# Patient Record
Sex: FEMALE | Race: WHITE | NOT HISPANIC OR LATINO | Employment: FULL TIME | URBAN - METROPOLITAN AREA
[De-identification: names, ages, dates, MRNs, and addresses within clinical notes are randomized per-mention and may not be internally consistent; named-entity substitution may affect disease eponyms.]

---

## 2017-01-12 ENCOUNTER — ALLSCRIPTS OFFICE VISIT (OUTPATIENT)
Dept: OTHER | Facility: OTHER | Age: 72
End: 2017-01-12

## 2017-01-24 ENCOUNTER — ALLSCRIPTS OFFICE VISIT (OUTPATIENT)
Dept: OTHER | Facility: OTHER | Age: 72
End: 2017-01-24

## 2017-02-14 ENCOUNTER — GENERIC CONVERSION - ENCOUNTER (OUTPATIENT)
Dept: OTHER | Facility: OTHER | Age: 72
End: 2017-02-14

## 2017-06-01 DIAGNOSIS — E78.5 HYPERLIPIDEMIA: ICD-10-CM

## 2017-06-01 DIAGNOSIS — R73.9 HYPERGLYCEMIA: ICD-10-CM

## 2017-06-05 ENCOUNTER — HOSPITAL ENCOUNTER (OUTPATIENT)
Dept: RADIOLOGY | Facility: HOSPITAL | Age: 72
Discharge: HOME | End: 2017-06-05
Attending: SURGERY
Payer: MEDICARE

## 2017-06-05 DIAGNOSIS — D49.0 NEOPLASM OF DIGESTIVE SYSTEM: ICD-10-CM

## 2017-06-05 PROCEDURE — A9585 GADOBUTROL INJECTION: HCPCS | Performed by: SURGERY

## 2017-06-05 PROCEDURE — 74181 MRI ABDOMEN W/O CONTRAST: CPT

## 2017-06-05 RX ADMIN — GADOBUTROL 5.9 ML: 604.72 INJECTION INTRAVENOUS at 10:43

## 2017-06-06 ENCOUNTER — HOSPITAL ENCOUNTER (OUTPATIENT)
Dept: MAMMOGRAPHY | Facility: MEDICAL CENTER | Age: 72
Discharge: HOME | End: 2017-06-06
Payer: MEDICARE

## 2017-06-06 DIAGNOSIS — D05.00 LOBULAR CARCINOMA IN SITU OF BREAST: ICD-10-CM

## 2017-06-06 DIAGNOSIS — Z12.31 ENCOUNTER FOR SCREENING MAMMOGRAM FOR HIGH-RISK PATIENT: ICD-10-CM

## 2017-06-06 PROCEDURE — 77063 BREAST TOMOSYNTHESIS BI: CPT

## 2017-06-06 PROCEDURE — G0202 SCR MAMMO BI INCL CAD: HCPCS

## 2017-06-14 ENCOUNTER — ALLSCRIPTS OFFICE VISIT (OUTPATIENT)
Dept: OTHER | Facility: OTHER | Age: 72
End: 2017-06-14

## 2017-07-03 ENCOUNTER — GENERIC CONVERSION - ENCOUNTER (OUTPATIENT)
Dept: OTHER | Facility: OTHER | Age: 72
End: 2017-07-03

## 2017-07-13 ENCOUNTER — ALLSCRIPTS OFFICE VISIT (OUTPATIENT)
Dept: OTHER | Facility: OTHER | Age: 72
End: 2017-07-13

## 2017-09-29 ENCOUNTER — GENERIC CONVERSION - ENCOUNTER (OUTPATIENT)
Dept: OTHER | Facility: OTHER | Age: 72
End: 2017-09-29

## 2018-01-09 NOTE — PROGRESS NOTES
History of Present Illness  Care Coordination Encounter Information:   Type of Encounter: Telephonic   Contact: Initial Contact   Last Office Visit: 3/9/16   Spoke to Patient   Outreached patient to see how she is doing post hospitalization for shortness of breath, CHF and AFib on 16  she did have follow up with primary care on 3/9/2016  She states she is doing well  She has an appointment with cardiology on 16 Dr Martin Hartman for an ablation consult  Only time she gets short of breath if she climbs two flights of stairs otherwise no problems  Active Problems    1  Anticoagulant long-term use (V58 61) (Z79 01)   2  Atrial fibrillation (427 31) (I48 91)   3  Congestive heart failure (428 0) (I50 9)   4  Depression (311) (F32 9)   5  Gastroesophageal reflux disease (530 81) (K21 9)   6  Hyperlipidemia (272 4) (E78 5)   7  Hypertension (401 9) (I10)   8  IPMN (intraductal papillary mucinous neoplasm) (239 0) (D49 0)   9  Lobular carcinoma in situ of breast (233 0) (D05 00)   10  Pancreatic mass (577 9) (K86 9)   11  Prediabetes (790 29) (R73 09)   12  Pulmonary hypertension (416 8) (I27 2)   13  Vitamin D deficiency (268 9) (E55 9)    Past Medical History    1  History of Arthritis (V13 4)   2  History of Atypical ductal hyperplasia of breast (610 8) (N60 99)   3  History of Depression (311) (F32 9)   4  History of Encounter for screening colonoscopy (V76 51) (Z12 11)   5  History of Heart burn (787 1) (R12)   6  History of hypertension (V12 59) (Z86 79)   7  History of Reported Prior Stomach Problems (V12 79)    Surgical History    1  History of  Section   2  History of Oophorectomy For Ectopic Pregnancy   3  History of Tubal Ligation    Family History    1  Family history of malignant neoplasm of breast (V16 3) (Z80 3)   2  Family history of Metastatic Cancer    3  Family history of cardiac disorder (V17 49) (Z82 49)    4  Family history of Leukemia (V16 6)   5   Family history of Metastatic Cancer    Social History    · Being A Social Drinker   · Denied: History of Drug Use   · Former smoker (V15 82) (O32 968)    Current Meds    1  Pradaxa 150 MG Oral Capsule; Take 1 tab BID; Therapy: 91MIK8913 to Recorded    2  PriLOSEC OTC 20 MG Oral Tablet Delayed Release; TAKE 1 TABLET DAILY; Therapy: 51VSQ2115 to Recorded    3  Atorvastatin Calcium 20 MG Oral Tablet; Therapy: 50EMY8492 to Recorded   4  Simvastatin 40 MG Oral Tablet; TAKE 1 TABLET DAILY AS DIRECTED; Therapy: 56FHD6637 to Recorded    5  Lisinopril 40 MG Oral Tablet; TAKE 1 TABLET DAILY; Therapy: 33UKX7150 to Recorded   6  Metoprolol Tartrate 100 MG Oral Tablet; Therapy: 96MXZ4349 to Recorded    7  Tamoxifen Citrate 20 MG Oral Tablet; Take 1 tablet daily; Therapy: 94CRY7781 to (Last Rx:19Jan2016)  Requested for: 20Jan2016 Ordered    8  MetFORMIN HCl - 500 MG Oral Tablet; Take 2 tablets daily; Therapy: 85CDA4116 to (Last Rx:17Nov2015)  Requested for: 30IGJ9548 Ordered    9  Vitamin D (Ergocalciferol) 93489 UNIT Oral Capsule; TAKE 1 CAPULE ONCE MONTHLY; Therapy: 82OHM6028 to (Last Rx:16Nov2015)  Requested for: 54VMO2508 Ordered    Allergies    1  No Known Drug Allergies    End of Encounter Meds    1  Pradaxa 150 MG Oral Capsule; Take 1 tab BID; Therapy: 39RYQ8018 to Recorded    2  PriLOSEC OTC 20 MG Oral Tablet Delayed Release; TAKE 1 TABLET DAILY; Therapy: 77IPZ0399 to Recorded    3  Atorvastatin Calcium 20 MG Oral Tablet; Therapy: 55KBD9108 to Recorded   4  Simvastatin 40 MG Oral Tablet; TAKE 1 TABLET DAILY AS DIRECTED; Therapy: 65ZCK5723 to Recorded    5  Lisinopril 40 MG Oral Tablet; TAKE 1 TABLET DAILY; Therapy: 54SYY3521 to Recorded   6  Metoprolol Tartrate 100 MG Oral Tablet; Therapy: 88GJT0951 to Recorded    7  Tamoxifen Citrate 20 MG Oral Tablet; Take 1 tablet daily; Therapy: 53PPB1019 to (Last Rx:19Jan2016)  Requested for: 20Jan2016 Ordered    8  MetFORMIN HCl - 500 MG Oral Tablet;  Take 2 tablets daily; Therapy: 33MRZ4436 to (Last Rx:17Nov2015)  Requested for: 06AIH4564 Ordered    9  Vitamin D (Ergocalciferol) 26233 UNIT Oral Capsule; TAKE 1 CAPULE ONCE MONTHLY; Therapy: 23DEE7198 to (Last Rx:16Nov2015)  Requested for: 18JRQ4640 Ordered    Future Appointments    Date/Time Provider Specialty Site   06/14/2016 10:00 AM NARDA Doherty  Surgical Oncology [facility]   06/06/2016 09:15 AM Gretchen Etienne MD Hematology Oncology [facility] MEDICAL ONCOLOG[facility]   05/23/2016 09:00 AM NARDA Paris   Internal Medicine Petra Haynes MD   04/01/2016 10:20 AM Amy Marinelli DO Cardiology [facility]     Patient Care Team    Care Team Member Role Specialty Office Number   Lucy Dorsey MD  Cardiology [phone]   Dimitrios Headley MD  Surgical Oncology [phone]   Awais Barrett MD  Obstetrics/Gynecology [phone]   Nikhil Harley MD  Gastroenterology Adult [phone]     Signatures   Electronically signed by : Martha Espinoza RN; Mar 30 2016 10:41AM EST                       (Author)

## 2018-01-10 NOTE — MISCELLANEOUS
Message   Recorded as Task   Date: 05/26/2016 04:13 PM, Created By: Chayito Martínez   Task Name: Follow Up   [facility]   Regarding Patient: Krysta Go, Status: In Progress   Comment:    Cuca Padilla - 26 May 2016 4:13 PM     TASK CREATED  Hi  Please relay to Ciara SNIDER printed up nurse visit note and EKG for Dr Teofilo Hdez to review  He said EKG looked good, no changes  Thanks  Rosmery Cabrera - 27 May 2016 8:05 AM     TASK IN PROGRESS   Phone call to patient,follow up to EKG from yest  Per Dr Teofilo Hdez EKG looks good, no changes  Patient understands and agrees      Active Problems    1  Abnormal CAT scan (793 99) (R93 8)   2  Anticoagulant long-term use (V58 61) (Z79 01)   3  Atrial fibrillation (427 31) (I48 91)   4  Congestive heart failure (428 0) (I50 9)   5  Depression (311) (F32 9)   6  Gastroesophageal reflux disease (530 81) (K21 9)   7  Hyperlipidemia (272 4) (E78 5)   8  Hypertension (401 9) (I10)   9  IPMN (intraductal papillary mucinous neoplasm) (239 0) (D49 0)   10  Lobular carcinoma in situ of breast (233 0) (D05 00)   11  Pancreatic mass (577 9) (K86 9)   12  Prediabetes (790 29) (R73 09)   13  Pulmonary hypertension (416 8) (I27 2)   14  Vitamin D deficiency (268 9) (E55 9)    Current Meds   1  Flecainide Acetate 100 MG Oral Tablet; take 1 tablet every twelve hours; Therapy: 39HQU7538 to (Evaluate:36Gjq6434); Last IJ:75HOV3247 Ordered   2  Furosemide 40 MG Oral Tablet; Take 1 tablet daily; Therapy: (Recorded:41Eat1044) to Recorded   3  Klor-Con M20 20 MEQ Oral Tablet Extended Release; TAKE TABLET Daily; Therapy: (Recorded:45Kkw4535) to Recorded   4  LORazepam 2 MG Oral Tablet; TAKE 1 TABLET Bedtime; Therapy: (Recorded:08Enn2545) to Recorded   5  MetFORMIN HCl - 500 MG Oral Tablet; Take 2 tablets daily; Therapy: 91GAY7787 to (Last Rx:17Nov2015)  Requested for: 23TDW2306 Ordered   6   Metoprolol Tartrate 100 MG Oral Tablet; TAKE 1 TABLET EVERY 12 HOURS DAILY; Therapy: 10KPY7566 to (Evaluate:28Oct2016) Recorded   7  Omeprazole 20 MG Oral Tablet Delayed Release; Take 1 tablet daily; Therapy: (Recorded:26May2016) to Recorded   8  Pradaxa 150 MG Oral Capsule; Take 1 tab BID; Therapy: 85YKY8374 to Recorded   9  Sertraline HCl - 50 MG Oral Tablet; Therapy: (Recorded:26May2016) to Recorded   10  Tamoxifen Citrate 20 MG Oral Tablet; Take 1 tablet daily; Therapy: 78HUC6353 to (Last Rx:19Jan2016)  Requested for: 20Jan2016 Ordered    Allergies    1   No Known Drug Allergies    Signatures   Electronically signed by : Zunilda Olmos, ; May 27 2016 11:23AM EST                       (Administrative)

## 2018-01-10 NOTE — MISCELLANEOUS
Message   Recorded as Task   Date: 05/23/2016 01:45 PM, Created By: Omi Matthews   Task Name: Follow Up   Assigned To: Citlalli Kendall   Regarding Patient: Zacarias Metzger, Status: Complete   Comment:    Omi Matthews - 23 May 2016 1:45 PM     TASK CREATED  Caller: Self; General Medical Question; [phone] (Home); [phone] (Work)  pt had ablation on Thurs May 19  Iliana Chin is great 60 -70 bpm but when she goes steps legs feel very heavy and she has SOB which subsides with laying down   this started on Saturday  States she was told if issues could restart her Lasix at 40 mg daily which was d/c'd She denies weight gain or noticable edema in legs  Please advise   Karol Riggins is in the lab  Thanks   Citlalli Kendall - 23 May 2016 2:51 PM     TASK Charmaine Meek - 23 May 2016 2:52 PM     TASK EDITED   Omi Matthews - 23 May 2016 2:54 PM     TASK EDITED   Omi Matthews - 23 May 2016 2:54 PM     TASK EDITED   pt called will restart Lasix 40 mg today and tomorrow to see if any change in symptoms   will call back office to update   Active Problems    1  Abnormal CAT scan (793 99) (R93 8)   2  Anticoagulant long-term use (V58 61) (Z79 01)   3  Atrial fibrillation (427 31) (I48 91)   4  Congestive heart failure (428 0) (I50 9)   5  Depression (311) (F32 9)   6  Gastroesophageal reflux disease (530 81) (K21 9)   7  Hyperlipidemia (272 4) (E78 5)   8  Hypertension (401 9) (I10)   9  IPMN (intraductal papillary mucinous neoplasm) (239 0) (D49 0)   10  Lobular carcinoma in situ of breast (233 0) (D05 00)   11  Pancreatic mass (577 9) (K86 9)   12  Prediabetes (790 29) (R73 09)   13  Pulmonary hypertension (416 8) (I27 2)   14  Vitamin D deficiency (268 9) (E55 9)    Current Meds   1  Atorvastatin Calcium 20 MG Oral Tablet; Therapy: 92DLB7980 to Recorded   2  Flecainide Acetate 100 MG Oral Tablet; take 1 tablet every twelve hours; Therapy: 41CHL9719 to (Evaluate:00Pqq4876); Last SR:58QCQ1297 Ordered   3  MetFORMIN HCl - 500 MG Oral Tablet; Take 2 tablets daily; Therapy: 56UVP9280 to (Last Rx:17Nov2015)  Requested for: 74UDE2420 Ordered   4  Metoprolol Tartrate 100 MG Oral Tablet; TAKE 1 TABLET EVERY 12 HOURS DAILY; Therapy: 03HED8615 to (Evaluate:28Oct2016) Recorded   5  Pradaxa 150 MG Oral Capsule; Take 1 tab BID; Therapy: 61WNJ0103 to Recorded   6  Tamoxifen Citrate 20 MG Oral Tablet; Take 1 tablet daily; Therapy: 45NMY2969 to (Last Rx:19Jan2016)  Requested for: 20Jan2016 Ordered    Allergies    1   No Known Drug Allergies    Signatures   Electronically signed by : Manjeet Hamilton, ; May 23 2016  2:57PM EST                       (Author)

## 2018-01-12 NOTE — MISCELLANEOUS
History of Present Illness    The patient is being contacted for follow-up after hospitalization  This was not a readmission  The date of discharge was 2/18/16  Spoke with the patient  She was discharged to home  Discharge instructions were reviewed, HF folder was given to patient, zone sheet was given to patient, medications were reviewed at time of discharge, HF Program Card was given to patient and the patient filled Her prescription(s)  The patient is currently asymptomatic  Knowledge Assessment/Teachback Questions: the patient is following diet, foods to limit/avoid, the patient is obtaining daily weights, she knows the name of her medications/water pill, the patient understands the activity/exercise plan and the patient knows when to report symptoms  Counseling was provided to the patient  Topics counseled included diet and need for daily weights  HFCC Additional Notes:   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  Current Meds   1  Atorvastatin Calcium 20 MG Oral Tablet; Therapy: 77CCP4476 to Recorded   2  Lisinopril 40 MG Oral Tablet; TAKE 1 TABLET DAILY; Therapy: 40ROF7634 to Recorded   3  MetFORMIN HCl - 500 MG Oral Tablet; Take 2 tablets daily; Therapy: 35LAK4334 to (Last Rx:17Nov2015)  Requested for: 26UID2274 Ordered   4  Metoprolol Tartrate 100 MG Oral Tablet; Therapy: 04OGM6057 to Recorded   5  Pradaxa 150 MG Oral Capsule; Take 1 tab BID; Therapy: 33OJV4500 to Recorded   6  PriLOSEC OTC 20 MG Oral Tablet Delayed Release; TAKE 1 TABLET DAILY; Therapy: 22BCO5826 to Recorded   7  Simvastatin 40 MG Oral Tablet; TAKE 1 TABLET DAILY AS DIRECTED; Therapy: 01MGD7020 to Recorded   8  Tamoxifen Citrate 20 MG Oral Tablet; Take 1 tablet daily; Therapy: 04OHN6624 to (Last Rx:19Jan2016)  Requested for: 20Jan2016 Ordered   9  Vitamin D (Ergocalciferol) 03127 UNIT Oral Capsule; TAKE 1 CAPULE ONCE MONTHLY;    Therapy: 42RCT8712 to (Last Rx:16Nov2015)  Requested for: 75THC2533 Ordered    Future Appointments    Date/Time Provider Specialty Site   06/14/2016 10:00 AM NARDA Lacey  Surgical Oncology [facility]   05/02/2016 08:45 AM Heather Etienne MD Hematology Oncology [facility]   03/09/2016 08:30 AM NARDA Villanueva  Internal Medicine Kris Drake MD     Allergies    1   No Known Drug Allergies    Signatures   Electronically signed by : Jany Lanier, ; Feb 19 2016  2:16PM EST                       (Author)

## 2018-01-14 VITALS
RESPIRATION RATE: 15 BRPM | HEIGHT: 62 IN | BODY MASS INDEX: 23.99 KG/M2 | HEART RATE: 75 BPM | OXYGEN SATURATION: 88 % | TEMPERATURE: 97.8 F | DIASTOLIC BLOOD PRESSURE: 80 MMHG | WEIGHT: 130.38 LBS | SYSTOLIC BLOOD PRESSURE: 132 MMHG

## 2018-01-14 VITALS
OXYGEN SATURATION: 97 % | DIASTOLIC BLOOD PRESSURE: 82 MMHG | HEIGHT: 62 IN | TEMPERATURE: 97.6 F | HEART RATE: 60 BPM | BODY MASS INDEX: 24.01 KG/M2 | RESPIRATION RATE: 14 BRPM | SYSTOLIC BLOOD PRESSURE: 130 MMHG | WEIGHT: 130.5 LBS

## 2018-01-14 VITALS
RESPIRATION RATE: 14 BRPM | WEIGHT: 132 LBS | SYSTOLIC BLOOD PRESSURE: 126 MMHG | BODY MASS INDEX: 24.29 KG/M2 | DIASTOLIC BLOOD PRESSURE: 80 MMHG | HEART RATE: 72 BPM | HEIGHT: 62 IN

## 2018-01-14 VITALS
BODY MASS INDEX: 24.48 KG/M2 | DIASTOLIC BLOOD PRESSURE: 80 MMHG | SYSTOLIC BLOOD PRESSURE: 128 MMHG | HEART RATE: 68 BPM | WEIGHT: 133 LBS | RESPIRATION RATE: 14 BRPM | HEIGHT: 62 IN

## 2018-01-18 NOTE — MISCELLANEOUS
Message   Recorded as Task   Date: 09/28/2017 03:50 PM, Created By: Dee Dee Gomes   Task Name: Call Back   Assigned To: Megan Mariee   Regarding Patient: Autumn Du, Status: Active   CommentFredtrip Cho - 28 Sep 2017 3:50 PM     TASK CREATED  Caller: Soheila Sims; [phone]  Calling because of an interaction with Tamoxifen and Sertraline  Please call back  Spoke with pharmacist and informed MD and pt are aware of interaction  Active Problems    1  Abnormal CAT scan (793 99) (R93 8)   2  Abnormal movement in bone (733 90) (M89 9)   3  Anticoagulant long-term use (V58 61) (Z79 01)   4  Atrial fibrillation (427 31) (I48 91)   5  Congestive heart failure (428 0) (I50 9)   6  Depression (311) (F32 9)   7  Gastroesophageal reflux disease (530 81) (K21 9)   8  Hyperglycemia (790 29) (R73 9)   9  Hyperlipidemia (272 4) (E78 5)   10  Hypertension (401 9) (I10)   11  IPMN (intraductal papillary mucinous neoplasm) (239 0) (D49 0)   12  Lobular carcinoma, noninfiltrating (233 0) (D05 00)   13  Osteopenia (733 90) (M85 80)   14  Pancreatic mass (577 9) (K86 9)   15  Pre-diabetes (790 29) (R73 03)   16  Pulmonary hypertension (416 8) (I27 2)   17  Tibia fracture (823 80) (S82 209A)   18  Visit for screening mammogram (V76 12) (Z12 31)   19  Vitamin B 12 deficiency (266 2) (E53 8)   20  Vitamin D deficiency (268 9) (E55 9)    Current Meds   1  Flecainide Acetate 100 MG Oral Tablet; take 1 tablet every twelve hours; Therapy: 75SIT4898 to (Evaluate:69Qai0620); Last OF:19MCG7540 Ordered   2  Furosemide 40 MG Oral Tablet; Take 1 tablet daily; Therapy: (Recorded:58Ftv4173) to Recorded   3  Klor-Con M20 20 MEQ Oral Tablet Extended Release; TAKE TABLET Daily; Therapy: (Recorded:91Ojz8632) to Recorded   4  Lisinopril 10 MG Oral Tablet; TAKE 1 TABLET DAILY; Therapy: 80JAD2874 to (Evaluate:65Trc6867) Recorded   5  LORazepam 2 MG Oral Tablet; TAKE 1 TABLET Bedtime;    Therapy: (Recorded:68Oae8286) to Recorded 6  MetFORMIN HCl - 500 MG Oral Tablet; AT THE START OF THERAPY,   TAKE 1 TABLET   DAILY FOR 2  WEEKS THEN 1 TABLET TWICE ADAY THEREAFTER; Therapy: 92IRD3213 to [phone])  Requested for: 36LAN9271; Last   Rx:28Oct2016 Ordered   7  Metoprolol Tartrate 100 MG Oral Tablet; TAKE 1 TABLET EVERY 12 HOURS DAILY; Therapy: 30RKI2662 to (Evaluate:28Oct2016) Recorded   8  Norvasc 5 MG Oral Tablet (AmLODIPine Besylate); Take 1 tablet daily; Therapy: 59LSY4340 to Recorded   9  Pradaxa 150 MG Oral Capsule; Take 1 tab BID; Therapy: 25IAV8927 to Recorded   10  Sertraline HCl - 100 MG Oral Tablet; TAKE ONE TABLET BY MOUTH ONCE DAILY; Therapy: 30Arg6287 to (Evaluate:12Jun2018)  Requested for: 93Icg9723; Last    Rx:69Ztj5850; Status: ACTIVE - Transmit to [facility] Verification Ordered   11  Sertraline HCl - 50 MG Oral Tablet; Therapy: (Recorded:42Vzp7522) to Recorded   12  Tamoxifen Citrate 20 MG Oral Tablet; TAKE ONE TABLET BY MOUTH ONCE DAILY; Therapy: 51SPM6855 to (Evaluate:26Mar2018)  Requested for: 48WWJ7003; Last    Rx:92Gko8900 Ordered    Allergies    1   No Known Drug Allergies    Signatures   Electronically signed by : González Dugan RN; Sep 29 2017  8:49AM EST                       (Author)

## 2018-03-28 DIAGNOSIS — D05.02 NEOPLASM OF LEFT BREAST, PRIMARY TUMOR STAGING CATEGORY TIS: LOBULAR CARCINOMA IN SITU (LCIS): Primary | ICD-10-CM

## 2018-03-28 RX ORDER — TAMOXIFEN CITRATE 20 MG/1
TABLET ORAL
Qty: 90 TABLET | Refills: 0 | Status: SHIPPED | OUTPATIENT
Start: 2018-03-28 | End: 2018-06-28 | Stop reason: SDUPTHER

## 2018-06-28 DIAGNOSIS — D05.02 NEOPLASM OF LEFT BREAST, PRIMARY TUMOR STAGING CATEGORY TIS: LOBULAR CARCINOMA IN SITU (LCIS): ICD-10-CM

## 2018-06-29 RX ORDER — TAMOXIFEN CITRATE 20 MG/1
TABLET ORAL
Qty: 90 TABLET | Refills: 0 | Status: SHIPPED | OUTPATIENT
Start: 2018-06-29 | End: 2018-12-14 | Stop reason: SDUPTHER

## 2018-09-18 RX ORDER — SERTRALINE HYDROCHLORIDE 100 MG/1
TABLET, FILM COATED ORAL
Qty: 90 TABLET | Refills: 1 | OUTPATIENT
Start: 2018-09-18

## 2018-09-22 DIAGNOSIS — D05.02 NEOPLASM OF LEFT BREAST, PRIMARY TUMOR STAGING CATEGORY TIS: LOBULAR CARCINOMA IN SITU (LCIS): ICD-10-CM

## 2018-09-23 RX ORDER — TAMOXIFEN CITRATE 20 MG/1
TABLET ORAL
Qty: 90 TABLET | Refills: 0 | OUTPATIENT
Start: 2018-09-23

## 2018-12-14 DIAGNOSIS — D05.02 NEOPLASM OF LEFT BREAST, PRIMARY TUMOR STAGING CATEGORY TIS: LOBULAR CARCINOMA IN SITU (LCIS): ICD-10-CM

## 2018-12-14 RX ORDER — TAMOXIFEN CITRATE 20 MG/1
20 TABLET ORAL DAILY
Qty: 30 TABLET | Refills: 0 | Status: SHIPPED | OUTPATIENT
Start: 2018-12-14

## 2018-12-14 NOTE — TELEPHONE ENCOUNTER
Called pt  Reached   Left message that [place] in [place] requested refill of Tamoxifen  Dr Namita Ahn will provide a 30 day supply without refills since she has not been seen in office July 2017  Please call office about an appt

## 2018-12-17 RX ORDER — SERTRALINE HYDROCHLORIDE 100 MG/1
TABLET, FILM COATED ORAL
Qty: 90 TABLET | Refills: 1 | OUTPATIENT
Start: 2018-12-17

## 2018-12-17 NOTE — TELEPHONE ENCOUNTER
Please notify pharmacy this patient moved out of the area 18 months ago and is no longer under my care

## 2019-01-09 DIAGNOSIS — D05.02 NEOPLASM OF LEFT BREAST, PRIMARY TUMOR STAGING CATEGORY TIS: LOBULAR CARCINOMA IN SITU (LCIS): ICD-10-CM

## 2019-01-09 RX ORDER — TAMOXIFEN CITRATE 20 MG/1
TABLET ORAL
Qty: 30 TABLET | Refills: 0 | OUTPATIENT
Start: 2019-01-09

## 2019-01-10 ENCOUNTER — TELEPHONE (OUTPATIENT)
Dept: HEMATOLOGY ONCOLOGY | Facility: CLINIC | Age: 74
End: 2019-01-10

## 2019-03-18 RX ORDER — SERTRALINE HYDROCHLORIDE 100 MG/1
TABLET, FILM COATED ORAL
Qty: 90 TABLET | Refills: 1 | OUTPATIENT
Start: 2019-03-18